# Patient Record
Sex: FEMALE | URBAN - METROPOLITAN AREA
[De-identification: names, ages, dates, MRNs, and addresses within clinical notes are randomized per-mention and may not be internally consistent; named-entity substitution may affect disease eponyms.]

---

## 2022-03-07 ENCOUNTER — APPOINTMENT (RX ONLY)
Age: 52
Setting detail: DERMATOLOGY
End: 2022-03-07

## 2022-03-07 DIAGNOSIS — L57.8 OTHER SKIN CHANGES DUE TO CHRONIC EXPOSURE TO NONIONIZING RADIATION: ICD-10-CM

## 2022-03-07 DIAGNOSIS — D22 MELANOCYTIC NEVI: ICD-10-CM

## 2022-03-07 DIAGNOSIS — L27.1 LOCALIZED SKIN ERUPTION DUE TO DRUGS AND MEDICAMENTS TAKEN INTERNALLY: ICD-10-CM | Status: INADEQUATELY CONTROLLED

## 2022-03-07 DIAGNOSIS — L82.1 OTHER SEBORRHEIC KERATOSIS: ICD-10-CM

## 2022-03-07 DIAGNOSIS — L81.1 CHLOASMA: ICD-10-CM

## 2022-03-07 PROBLEM — D22.9 MELANOCYTIC NEVI, UNSPECIFIED: Status: ACTIVE | Noted: 2022-03-07

## 2022-03-07 PROBLEM — L30.9 DERMATITIS, UNSPECIFIED: Status: ACTIVE | Noted: 2022-03-07

## 2022-03-07 PROBLEM — D22.5 MELANOCYTIC NEVI OF TRUNK: Status: ACTIVE | Noted: 2022-03-07

## 2022-03-07 PROCEDURE — 99204 OFFICE O/P NEW MOD 45 MIN: CPT

## 2022-03-07 PROCEDURE — ? PRESCRIPTION

## 2022-03-07 PROCEDURE — ? COUNSELING

## 2022-03-07 PROCEDURE — ? PATIENT SPECIFIC COUNSELING

## 2022-03-07 PROCEDURE — ? OBSERVATION

## 2022-03-07 RX ORDER — TACROLIMUS 1 MG/G
OINTMENT TOPICAL
Qty: 30 | Refills: 0 | Status: ERX | COMMUNITY
Start: 2022-03-07

## 2022-03-07 RX ORDER — TRETIONIN 0.25 MG/G
CREAM TOPICAL
Qty: 20 | Refills: 3 | Status: ERX | COMMUNITY
Start: 2022-03-07

## 2022-03-07 RX ADMIN — TRETIONIN: 0.25 CREAM TOPICAL at 00:00

## 2022-03-07 RX ADMIN — TACROLIMUS: 1 OINTMENT TOPICAL at 00:00

## 2022-03-07 ASSESSMENT — LOCATION SIMPLE DESCRIPTION DERM
LOCATION SIMPLE: LEFT UPPER BACK
LOCATION SIMPLE: UPPER BACK

## 2022-03-07 ASSESSMENT — LOCATION DETAILED DESCRIPTION DERM
LOCATION DETAILED: LEFT INFERIOR MEDIAL UPPER BACK
LOCATION DETAILED: INFERIOR THORACIC SPINE

## 2022-03-07 ASSESSMENT — LOCATION ZONE DERM: LOCATION ZONE: TRUNK

## 2022-03-07 NOTE — HPI: RASH (PERIORAL DERMATITIS)
How Severe Is It?: moderate
Is This A New Presentation, Or A Follow-Up?: Rash
Additional History: Patient reports that since Sept 2021, she had redness and swelling around her upper lip. The redness comes and goes. Denies bumps. It did get scaly when she was out in the cold and wind-exposed. She did get some pimples on her cheeks at the same time that did not go away for a while. Does not think it was painful. She applies castor oil nightly, which she thinks is helping.\\n\\nSsurjit does use different masks but it does not matter. She works from home, so does not go out often. She stopped eating butter. She does think it’s worse when she is out in the cold and skiing. Thinks it could be stress-related. Denies taking any medication in the past 6 months. She sometimes uses toothpaste with fluoride (especially when traveling) but usually uses non-fluoride; does not think it is related. She was using Sensodyne but stopped. Does have seasonal allergies (grass, dust) but it is manageable.\\n\\nHer Covid vaccinations were in March 2021, April 2021, and November 2021. She had traveled in Sept 2021 and does not think she had it at the time. \\n\\nShe takes a lot of supplements but does not always take the same ones. Right now, she is taking a multivitamin, ashwagandha, iron, and other powders. Sometimes takes B12 but is not right now. She is a pescatarian, rarely eats eggs. Is not taking probiotics right now.\\n\\nShe tested negative for bacterial and fungal infections.\\n\\nShe is also interested in treating brown spots on her face. She has tried IPL treatment and tretinoin in the past.

## 2022-03-07 NOTE — PROCEDURE: PATIENT SPECIFIC COUNSELING
Detail Level: Zone
I advised her to avoid fluoride or any special toothpastes and to track her flares and possible triggers.\\n\\nWill RTC if she flares.\\n
I counseled her on using the UV Index mike.
We discussed options for treatment (Obagi serum, Skinceuticals retinol, tretinoin). She decided to try tretinoin and purchased Obagi Vit C serum. I advised her not to start the treatments at the same time.